# Patient Record
Sex: FEMALE | Race: WHITE | NOT HISPANIC OR LATINO | Employment: UNEMPLOYED | URBAN - METROPOLITAN AREA
[De-identification: names, ages, dates, MRNs, and addresses within clinical notes are randomized per-mention and may not be internally consistent; named-entity substitution may affect disease eponyms.]

---

## 2021-10-23 ENCOUNTER — HOSPITAL ENCOUNTER (EMERGENCY)
Facility: CLINIC | Age: 21
Discharge: HOME | End: 2021-10-23
Attending: EMERGENCY MEDICINE | Admitting: EMERGENCY MEDICINE
Payer: COMMERCIAL

## 2021-10-23 VITALS
HEART RATE: 95 BPM | OXYGEN SATURATION: 100 % | RESPIRATION RATE: 20 BRPM | SYSTOLIC BLOOD PRESSURE: 136 MMHG | DIASTOLIC BLOOD PRESSURE: 96 MMHG

## 2021-10-23 DIAGNOSIS — Z00.00 EVALUATION BY MEDICAL SERVICE REQUIRED: ICD-10-CM

## 2021-10-23 PROCEDURE — 99283 EMERGENCY DEPT VISIT LOW MDM: CPT

## 2021-10-24 ENCOUNTER — HOSPITAL ENCOUNTER (EMERGENCY)
Facility: CLINIC | Age: 21
Discharge: HOME | End: 2021-10-24
Attending: EMERGENCY MEDICINE | Admitting: EMERGENCY MEDICINE
Payer: COMMERCIAL

## 2021-10-24 VITALS
HEART RATE: 82 BPM | OXYGEN SATURATION: 100 % | DIASTOLIC BLOOD PRESSURE: 64 MMHG | SYSTOLIC BLOOD PRESSURE: 100 MMHG | RESPIRATION RATE: 16 BRPM | TEMPERATURE: 98.3 F

## 2021-10-24 DIAGNOSIS — Z59.00 HOMELESS: ICD-10-CM

## 2021-10-24 DIAGNOSIS — F15.90 AMPHETAMINE USER: ICD-10-CM

## 2021-10-24 DIAGNOSIS — F41.9 ANXIETY: ICD-10-CM

## 2021-10-24 DIAGNOSIS — F12.90 MARIJUANA USE: ICD-10-CM

## 2021-10-24 LAB
ALCOHOL BREATH TEST: 0 (ref 0–0.01)
AMPHETAMINES UR QL SCN: ABNORMAL
BARBITURATES UR QL: ABNORMAL
BENZODIAZ UR QL: ABNORMAL
CANNABINOIDS UR QL SCN: ABNORMAL
COCAINE UR QL: ABNORMAL
HCG UR QL: NEGATIVE
OPIATES UR QL SCN: ABNORMAL

## 2021-10-24 PROCEDURE — 90791 PSYCH DIAGNOSTIC EVALUATION: CPT

## 2021-10-24 PROCEDURE — 81025 URINE PREGNANCY TEST: CPT | Performed by: EMERGENCY MEDICINE

## 2021-10-24 PROCEDURE — 80307 DRUG TEST PRSMV CHEM ANLYZR: CPT | Performed by: EMERGENCY MEDICINE

## 2021-10-24 PROCEDURE — 99285 EMERGENCY DEPT VISIT HI MDM: CPT | Mod: 25

## 2021-10-24 PROCEDURE — 82075 ASSAY OF BREATH ETHANOL: CPT

## 2021-10-24 SDOH — ECONOMIC STABILITY - HOUSING INSECURITY: HOMELESSNESS UNSPECIFIED: Z59.00

## 2021-10-24 ASSESSMENT — ENCOUNTER SYMPTOMS
COUGH: 0
FEVER: 0
DYSURIA: 0
NERVOUS/ANXIOUS: 1
ABDOMINAL PAIN: 0
DYSPHORIC MOOD: 1
FEVER: 0
CHILLS: 0
SLEEP DISTURBANCE: 1

## 2021-10-24 NOTE — ED PROVIDER NOTES
History   Chief Complaint:  Mental Health Problem       The history is provided by the patient.      Zarina Pink is a 21 year old female who presents for evaluation by EMS.  Per EMS and healthcare officer hold information, the patient was at [employer] today and had erratic and unpredictable behavior.  She apparently did not answer questions so they placed her on a hold and brought her here due to concern for safety and ability to care for herself.  She tells me that she walked to [facility] in [place] because she was upset, distressed, cold, and homeless. She called her aunt who lives in [place], but her aunt was unable to pick her up. The [facility] security called EMS who brought her here.  She has no suicidal ideation, homicidal ideation, chest pain, cough, fever, chills, abdominal pain, and dysuria. She was in contact with a friend who tested positive for COVID-19, but she tested at urgent care recently and tested negative. Currently, she is staying at a homeless shelter in [place].  She tells me that she went to [facility] so that she could call police to get a ride to her homeless shelter.    Review of Systems   Constitutional: Negative for chills and fever.   Respiratory: Negative for cough.    Cardiovascular: Negative for chest pain.   Gastrointestinal: Negative for abdominal pain.   Genitourinary: Negative for dysuria.   Psychiatric/Behavioral: Negative for suicidal ideas.        Mental Health Problem  Negative for homicidal ideation    All other systems reviewed and are negative.      Allergies:  No Known Allergies    Medications:  None     Past Medical History:     Obesity     Social History:  Presents alone.  Arrival via EMS.   PCP: Chichi Nguyen    Physical Exam     Patient Vitals for the past 24 hrs:   BP Pulse Resp SpO2   10/23/21 2341 (!) 136/96 95 20 100 %       Physical Exam  Constitutional: Well appearing.  HEENT: Atraumatic. Moist mucous membranes.  Neck: Soft.  Supple.   Cardiac: Regular rate and  rhythm.  No murmur or rub.  Respiratory: Clear to auscultation bilaterally.  No respiratory distress.    Abdomen: Soft and nontender.  Nondistended.  Musculoskeletal: No edema.  Normal range of motion.  Neurologic: Alert and oriented x3.  Normal tone and bulk.  Normal gait.  Skin: No rashes.  No edema.  Psych: Normal affect.  Normal behavior.  She exhibits no odd or manic behavior.  No pressured or tangential speech.  She does not appear to be responding to internal stimuli.  She has no suicidal ideation    Emergency Department Course     Emergency Department Course:  Reviewed:  I reviewed nursing notes, vitals, past medical history and Care Everywhere    Assessments:  1141 I obtained history and examined the patient as noted above.     Disposition:  The patient was discharged to home.     Impression & Plan   Medical Decision Making:  Zarina Pink is a 21-year-old woman who is afebrile and hemodynamically stable.  She is alert and fully oriented.  She apparently was less uncooperative with law enforcement and EMS prompting their bringing her here.  Here, she states she is stressed and homeless and was cold and tired, however, she went to [employer] to get a ride to a homeless shelter.  She denies any mental health complaints is not concerned about her mental health.  She has no suicidal ideation.  She exhibits no automatic behavior at this time.  She has no physical complaints.  She was initially hesitant to provide much history, however, stated multiple times that she just wanted [employer] to get a ride to a homeless shelter.  I offered to help her get to homeless shelter and stated that we will call and find a shelter for her.  She asked if she can just leave.  I discussed that she is welcome to leave as there is no indication to hold her here against her will, however, we again offered to set her up to find shelter for tonight.  She declined and stated that she wanted to leave the nursing staff and she left.  She  appeared of sound mind and able to make her own safe decisions.    Diagnosis:    ICD-10-CM    1. Evaluation by medical service required  Z00.00        Scribe Disclosure:  I, Gonzales Levin, am serving as a scribe at 11:37 PM on 10/23/2021 to document services personally performed by Quoc Younger MD based on my observations and the provider's statements to me.            Quoc Younger MD  10/24/21 0058       Quoc Younger MD  10/24/21 0327
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
## 2021-10-24 NOTE — ED TRIAGE NOTES
Brought in by EMS for paranoid behaviors.  Pt refusing to answer questions or cooperate with exam.

## 2021-10-24 NOTE — ED PROVIDER NOTES
History   Chief Complaint:  Mental Health Problem       HPI   Zarina Pink is a 21 year old female presenting for mental health evaluation.  Patient was seen a few hours by my partner though patient declined wanting evaluation at that point time.  She admitted to being homeless though was not deemed to be an immediate threat to herself and appeared to have capacity to make her own medical decisions.  She represents with her friend Alexandro who expresses concerns for patient's mental health.  Alexandro tells me that patient is tangential in her thinking and is not sleeping.  Patient denies any thoughts of self-harm or hallucinations.  She denies any physical symptoms.  She states she is homeless and has nowhere to go and is very anxious about what is going to happen.  She does admit to using meth and marijuana yesterday as well as occasional alcohol use though none today.    Review of Systems   Constitutional: Negative for fever.   Psychiatric/Behavioral: Positive for dysphoric mood and sleep disturbance. Negative for self-injury and suicidal ideas. The patient is nervous/anxious.    All other systems reviewed and are negative.        Allergies:  No Known Allergies     Medications:  None      Past Medical History:     Obesity      Social History:  Presents alone.  PCP: Chichi Nguyen    Physical Exam     Patient Vitals for the past 24 hrs:   BP Temp Temp src Pulse Resp SpO2   10/24/21 0056 (!) 146/94 98.3  F (36.8  C) Oral 95 20 99 %       Physical Exam  Nursing note and vitals reviewed.  Constitutional: Well nourished. Resting comfortably.   Eyes: Conjunctiva normal.  Pupils are equal, round, and reactive to light.   ENT: Nose normal. Mucous membranes pink and moist.    Neck: Normal range of motion.  CVS: Normal rate, regular rhythm.  Normal heart sounds.  No murmur.  Pulmonary: Lungs clear to auscultation bilaterally. No wheezes/rales/rhonchi.  GI: Abdomen soft. Nontender, nondistended. No rigidity or guarding.     MSK: No calf tenderness or swelling.  Neuro: Alert. Follows simple commands.  Skin: Skin is warm and dry. No rash noted.   Psychiatric: Flat affect, denies hallucinations or suicidal ideations .  Speaks very little to me with brief responses.      Emergency Department Course     Laboratory:  Labs Ordered and Resulted from Time of ED Arrival Up to the Time of Departure from the ED   DRUG ABUSE SCREEN 1 URINE (ED) - Abnormal; Notable for the following components:       Result Value    Amphetamines Urine Screen Positive (*)     Cannabinoids Urine Screen Positive (*)     All other components within normal limits   HCG QUALITATIVE URINE - Normal   ALCOHOL BREATH TEST POCT - Normal   DOCUMENT IN LEGAL HOLD NAVIGATOR   URINE DRUGS OF ABUSE SCREEN    Narrative:     The following orders were created for panel order Urine Drugs of Abuse Screen.  Procedure                               Abnormality         Status                     ---------                               -----------         ------                     Drug abuse screen 1 urin...[[id number]]  Abnormal            Final result                 Please view results for these tests on the individual orders.        Emergency Department Course:  Reviewed:  I reviewed nursing notes, vitals and past medical history    Assessments:   I obtained history and examined the patient as noted above.    I rechecked the patient and explained findings    Consults  6:21 AM   Patient was evaluated by DEC    Disposition:  Patient to be discharged home pending pickup from patient's aunt    Impression & Plan     Medical Decision Making:  Patient is a 21-year-old female presenting for mental health evaluation.  She was already evaluated in the ED a few hours prior to my evaluation.  Her friend accompanies her now stating that patient is tangential and not sleeping.  Her friend is concerned about her safety.  Patient does admit to being homeless.  She is requesting to speak to DEC today.   Patient denies any suicidal ideations or hallucinations to me though was a very limited historian.  She does not appear psychotic, however and is currently not on an RANJEET.  She did test positive for marijuana and amphetamine use which she admits to. Patient did speak to DEC during her time in the ED. She does not appear to need inpatient psychiatric placement at this point in time. Patient is not psychotic and quite lucid in her thinking. She denies again suicidal ideations. I did offer her a list of homeless shelters but she is declining. She states she has a place to stay in [place]. Patient to be discharged with her aunt and is pending pickup at this point in time.  She is signed out to my partner Dr. Duffy.     Diagnosis:    ICD-10-CM    1. Homeless  Z59.00    2. Depression, unspecified depression type  F32.A    3. Amphetamine user (H)  F15.10    4. Marijuana use  F12.90        Discharge Medications:  New Prescriptions    No medications on file          Kellie Miranda DO  10/24/21 0667

## 2021-10-24 NOTE — ED TRIAGE NOTES
Pt was seen at this facility earlier today via EMS for mental health evaluation. Pt requested to leave shortly after arrival. Pt states she wishes to be re-evaluated. Pt expresses concern for possible pregnancy as well. ABCs intact GCS 15

## 2023-03-31 ENCOUNTER — HOSPITAL ENCOUNTER (EMERGENCY)
Facility: CLINIC | Age: 23
Discharge: HOME | End: 2023-03-31
Attending: NURSE PRACTITIONER | Admitting: NURSE PRACTITIONER
Payer: COMMERCIAL

## 2023-03-31 VITALS
OXYGEN SATURATION: 100 % | DIASTOLIC BLOOD PRESSURE: 73 MMHG | HEART RATE: 115 BPM | SYSTOLIC BLOOD PRESSURE: 120 MMHG | RESPIRATION RATE: 18 BRPM | TEMPERATURE: 98.1 F

## 2023-03-31 DIAGNOSIS — Z20.2 STD EXPOSURE: ICD-10-CM

## 2023-03-31 DIAGNOSIS — N89.8 VAGINAL DISCHARGE: ICD-10-CM

## 2023-03-31 LAB
ALBUMIN UR-MCNC: 100 MG/DL
APPEARANCE UR: ABNORMAL
BACTERIA #/AREA URNS HPF: ABNORMAL /HPF
BILIRUB UR QL STRIP: NEGATIVE
CAOX CRY #/AREA URNS HPF: ABNORMAL /HPF
CLUE CELLS: NORMAL
COLOR UR AUTO: YELLOW
GLUCOSE UR STRIP-MCNC: NEGATIVE MG/DL
HGB UR QL STRIP: NEGATIVE
KETONES UR STRIP-MCNC: 5 MG/DL
LEUKOCYTE ESTERASE UR QL STRIP: ABNORMAL
MUCOUS THREADS #/AREA URNS LPF: PRESENT /LPF
NITRATE UR QL: NEGATIVE
PH UR STRIP: 5 [PH] (ref 5–7)
RBC URINE: 21 /HPF
SP GR UR STRIP: 1.03 (ref 1–1.03)
SQUAMOUS EPITHELIAL: >182 /HPF
TRICHOMONAS, WET PREP: NORMAL
UROBILINOGEN UR STRIP-MCNC: NORMAL MG/DL
WBC URINE: >182 /HPF
WBC'S/HIGH POWER FIELD, WET PREP: NORMAL
YEAST, WET PREP: NORMAL

## 2023-03-31 PROCEDURE — 96372 THER/PROPH/DIAG INJ SC/IM: CPT | Performed by: NURSE PRACTITIONER

## 2023-03-31 PROCEDURE — 87591 N.GONORRHOEAE DNA AMP PROB: CPT | Performed by: NURSE PRACTITIONER

## 2023-03-31 PROCEDURE — 99213 OFFICE O/P EST LOW 20 MIN: CPT | Performed by: NURSE PRACTITIONER

## 2023-03-31 PROCEDURE — 250N000009 HC RX 250: Performed by: NURSE PRACTITIONER

## 2023-03-31 PROCEDURE — G0463 HOSPITAL OUTPT CLINIC VISIT: HCPCS | Performed by: NURSE PRACTITIONER

## 2023-03-31 PROCEDURE — G0463 HOSPITAL OUTPT CLINIC VISIT: HCPCS | Mod: 25 | Performed by: NURSE PRACTITIONER

## 2023-03-31 PROCEDURE — 81001 URINALYSIS AUTO W/SCOPE: CPT | Performed by: NURSE PRACTITIONER

## 2023-03-31 PROCEDURE — 87086 URINE CULTURE/COLONY COUNT: CPT | Performed by: NURSE PRACTITIONER

## 2023-03-31 PROCEDURE — 87210 SMEAR WET MOUNT SALINE/INK: CPT | Performed by: NURSE PRACTITIONER

## 2023-03-31 PROCEDURE — 250N000011 HC RX IP 250 OP 636: Performed by: NURSE PRACTITIONER

## 2023-03-31 RX ORDER — DOXYCYCLINE 100 MG/1
100 CAPSULE ORAL 2 TIMES DAILY
Qty: 14 CAPSULE | Refills: 0 | Status: SHIPPED | OUTPATIENT
Start: 2023-03-31 | End: 2023-04-07

## 2023-03-31 RX ADMIN — LIDOCAINE HYDROCHLORIDE 500 MG: 10 INJECTION, SOLUTION EPIDURAL; INFILTRATION; INTRACAUDAL; PERINEURAL at 18:19

## 2023-03-31 ASSESSMENT — ENCOUNTER SYMPTOMS
JOINT SWELLING: 0
NAUSEA: 0
EYE DISCHARGE: 0
SINUS PRESSURE: 0
ABDOMINAL PAIN: 0
CHILLS: 0
FATIGUE: 0
SHORTNESS OF BREATH: 0
HEADACHES: 0
COUGH: 0
LIGHT-HEADEDNESS: 0
VOMITING: 0
RHINORRHEA: 0
SORE THROAT: 0
TROUBLE SWALLOWING: 0
EYE REDNESS: 0
WEAKNESS: 0
NUMBNESS: 0
FEVER: 0
SINUS PAIN: 0
ARTHRALGIAS: 0
MYALGIAS: 0
DIZZINESS: 0

## 2023-03-31 ASSESSMENT — ACTIVITIES OF DAILY LIVING (ADL): ADLS_ACUITY_SCORE: 35

## 2023-03-31 NOTE — ED TRIAGE NOTES
Pt states she believes partner tested positive for gonorrhea and would like std testing today

## 2023-03-31 NOTE — ED PROVIDER NOTES
History     Chief Complaint   Patient presents with     Exposure to STD     HPI  Zarina Pink is a 23 year old female who presents to the urgent care for evaluation due to STD exposure. Reports her boyfriend was tested for gonorrhea and chlamydia today, treated, and awaiting results. She is requesting the same. She noticed increased thick white vaginal discharge today with slight malodor. No fevers, abdominal pain, flank pain, pelvic pain, hematuria, vaginal bleeding. Denies chance of pregnancy.       Allergies:  No Known Allergies    Problem List:    Patient Active Problem List    Diagnosis Date Noted     Obesity 10/20/2011     Priority: Medium        Past Medical History:    No past medical history on file.    Past Surgical History:    No past surgical history on file.    Family History:    Family History   Problem Relation Age of Onset     Diabetes Maternal Grandfather      Diabetes Other      Asthma Maternal Grandmother      Cancer No family hx of      Hypertension No family hx of      Cerebrovascular Disease No family hx of      Thyroid Disease No family hx of      Glaucoma No family hx of      Macular Degeneration No family hx of        Social History:  Marital Status:  Single [1]  Social History     Tobacco Use     Smoking status: Never     Smokeless tobacco: Never     Tobacco comments:     Lives in smoke free household   Substance Use Topics     Alcohol use: No     Drug use: No        Medications:    doxycycline hyclate (VIBRAMYCIN) 100 MG capsule  NO ACTIVE MEDICATIONS          Review of Systems   Constitutional: Negative for chills, fatigue and fever.   HENT: Negative for congestion, ear discharge, ear pain, rhinorrhea, sinus pressure, sinus pain, sore throat and trouble swallowing.    Eyes: Negative for discharge and redness.   Respiratory: Negative for cough and shortness of breath.    Cardiovascular: Negative for chest pain.   Gastrointestinal: Negative for abdominal pain, nausea and vomiting.    Genitourinary: Positive for vaginal discharge.   Musculoskeletal: Negative for arthralgias, joint swelling and myalgias.   Skin: Negative for rash.   Neurological: Negative for dizziness, weakness, light-headedness, numbness and headaches.   All other systems reviewed and are negative.      Physical Exam   BP: 120/73  Pulse: 115  Temp: 98.1  F (36.7  C)  Resp: 18  SpO2: 100 %      Physical Exam  Constitutional:       General: She is not in acute distress.     Appearance: She is well-developed. She is not diaphoretic.   Eyes:      Conjunctiva/sclera: Conjunctivae normal.      Pupils: Pupils are equal, round, and reactive to light.   Cardiovascular:      Rate and Rhythm: Normal rate and regular rhythm.      Pulses: Normal pulses.   Pulmonary:      Effort: Pulmonary effort is normal.      Breath sounds: Normal air entry. No decreased air movement. No decreased breath sounds.   Musculoskeletal:         General: Normal range of motion.      Cervical back: Normal range of motion and neck supple.   Skin:     General: Skin is warm.      Capillary Refill: Capillary refill takes less than 2 seconds.   Neurological:      General: No focal deficit present.      Mental Status: She is alert and oriented to person, place, and time.   Psychiatric:         Mood and Affect: Mood normal.         ED Course                 Procedures           Results for orders placed or performed during the hospital encounter of 03/31/23 (from the past 24 hour(s))   UA with Microscopic reflex to Culture    Specimen: Urine, Clean Catch   Result Value Ref Range    Color Urine Yellow Colorless, Straw, Light Yellow, Yellow    Appearance Urine Cloudy (A) Clear    Glucose Urine Negative Negative mg/dL    Bilirubin Urine Negative Negative    Ketones Urine 5 (A) Negative mg/dL    Specific Gravity Urine 1.028 1.003 - 1.035    Blood Urine Negative Negative    pH Urine 5.0 5.0 - 7.0    Protein Albumin Urine 100 (A) Negative mg/dL    Urobilinogen Urine Normal  Normal, 2.0 mg/dL    Nitrite Urine Negative Negative    Leukocyte Esterase Urine Moderate (A) Negative    Bacteria Urine Many (A) None Seen /HPF    Mucus Urine Present (A) None Seen /LPF    Calcium Oxalate Crystals Urine Many (A) None Seen /HPF    RBC Urine 21 (H) <=2 /HPF    WBC Urine >182 (H) <=5 /HPF    Squamous Epithelials Urine >182 (H) <=1 /HPF    Narrative    Urine Culture ordered based on laboratory criteria   Wet prep    Specimen: Vagina; Swab   Result Value Ref Range    Trichomonas Absent Absent    Yeast Absent Absent    Clue Cells Absent Absent    WBCs/high power field None None       Medications   cefTRIAXone (ROCEPHIN) 500 mg in lidocaine injection (500 mg Intramuscular $Given 3/31/23 1819)       Assessments & Plan (with Medical Decision Making)   Zarina Pink is a 23 year old female who presents to the urgent care for evaluation due to STD exposure. Reports her boyfriend was tested for gonorrhea and chlamydia today, treated, and awaiting results. She is requesting the same. She noticed increased thick white vaginal discharge today with slight malodor. Vital signs normal, physical exam as above. Wet prep negative. Urinalysis grossly contaminated. Gonorrhea and chlamydia pending. IM Rocephin given prior to discharge, rx doxycycline. Return precautions reviewed, all questions answered. Patient is agreeable to plan of care and discharged in no acute distress.     I have reviewed the nursing notes.    I have reviewed the findings, diagnosis, plan and need for follow up with the patient.    Discharge Medication List as of 3/31/2023  6:17 PM      START taking these medications    Details   doxycycline hyclate (VIBRAMYCIN) 100 MG capsule Take 1 capsule (100 mg) by mouth 2 times daily for 7 days, Disp-14 capsule, R-0, E-Prescribe             Final diagnoses:   STD exposure   Vaginal discharge       3/31/2023   [facility] EMERGENCY DEPT     Anastasiia Simpson, APRN CNP  03/31/23 8577

## 2023-03-31 NOTE — ED TRIAGE NOTES
Pt was advised to stay 20min after injection and stated that she wanted to wait in the lobby area.

## 2023-04-01 ENCOUNTER — TELEPHONE (OUTPATIENT)
Dept: EMERGENCY MEDICINE | Facility: CLINIC | Age: 23
End: 2023-04-01
Payer: COMMERCIAL

## 2023-04-01 LAB
C TRACH DNA SPEC QL PROBE+SIG AMP: NEGATIVE
N GONORRHOEA DNA SPEC QL NAA+PROBE: POSITIVE

## 2023-04-01 NOTE — TELEPHONE ENCOUNTER
[facility] Emergency Department/Urgent Care Lab result notification  [Note:  ED Lab Results RN will reference the [facility] Emergency Dept visit note prior to contacting patient AND/OR prior to consulting Emergency Dept Provider.  Highlights of Emergency Dept visit in information summary at the bottom of this telephone note]    1. Reason for call    Notify the patient/parent of lab results    Assess patient symptoms    Review ED providers recommendations/discharge instructions (if necessary)    Advise per [facility] ED lab result protocol    2. Lab Result (including Rx patient on, if applicable).  Copy of lab result from [facility] at bottom of charting  Final N. Gonorrhoeae PCR is [POSITIVE] AND Chlamydia T PCR is [NEGATIVE]  (Specimen source: Urine, Voided)  Patient was treated for N. Gonorrhea AND/OR Chlamydia T in the [facility] Emergency Dept  [Yes or No]:  YES       If Yes, list what was given in the ED:  cefTRIAXone (ROCEPHIN) 500 mg in lidocaine injection -  500 mg, Intramuscular, ONCE  [facility] Emergency Dept discharge antibiotic (if prescribed): Doxycycline 100 mg PO tablet, 1 tablet (100 mg) by mouth 2 times daily for 7 days  Recommendations in treatment per [facility] ED lab result Chlamydia T. AND/OR N. Gonorrhea protocol    3. RN Assessment (Patient's current Symptoms):    Time of call: 4/1/2023 11:57 AM    Assessment: recently seen has been drinking in a lot of water    Fever:    No    Genitourinary symptoms:  No pain, burning with urination, urgency, frequency    Vaginal discharge:  Continues with vaginal discharge    Oral Sex:  YES    4. RN Recommendations/Instructions per [facility] ED lab result protocol    [facility] ED lab result protocol used: Gonorrheae    RN will consult with [facility] Emergency Dept Provider and then call patient back with recommendations (Yes/No/NA): NO    Patient notified of lab result and treatment recommendations  (Yes/No): YES    RN reviewed information about positive results and patient given appropriate treatment coverage, STD instructions, AVS instructions, discussed f/u appointment with PCP is encouraged and should discuss 'test for cure' throat swab for gonorrhea with PCP since she notes participation.  Urine culture in process - team only calls on positive results - not on negative - welcome to call for results as well.  Return at anytime for worsening - fevers, flank pain, nausea/vomiting, severe continuous abdominal pain, severe weakness - patient verbalized understanding and agrees with plan.      STD Patient Instructions:    We recommend that you contact any recent sexual partners within the last 2 months and have them evaluated by a physician.    Avoid sexual activity for 7 to 10 days or until both you and your partner(s) have completed all antibiotic medications.    Contact PCP or return to the Emergency Dept within 24 hours if you are experiencing persistent or recurrent symptoms soon after completing therapy.    We advise that you consider following up with your PCP at approximately 3 months for retesting to be sure the infection has cleared.      5. Please Contact your PCP clinic or return to the Emergency department if your:    Symptoms return.    Symptoms do not improve after 3 days on antibiotic.    Symptoms do not resolve after completing antibiotic.    Symptoms worsen or other concerning symptoms.    7. Copy of Lab result   Component      Latest Ref Rng & Units 3/31/2023   Chlamydia Trachomatis PCR      Negative Negative   Neisseria gonorrhoeae      Negative Positive (A)         Bianka Chawla RN  [facility] [place]  Emergency Dept Lab Result RN  Ph# [phone]

## 2023-04-02 ENCOUNTER — APPOINTMENT (OUTPATIENT)
Dept: URGENT CARE | Facility: CLINIC | Age: 23
End: 2023-04-02
Payer: COMMERCIAL

## 2023-04-02 LAB — BACTERIA UR CULT: NORMAL

## 2023-04-16 ENCOUNTER — HEALTH MAINTENANCE LETTER (OUTPATIENT)
Age: 23
End: 2023-04-16

## 2023-07-04 ENCOUNTER — HOSPITAL ENCOUNTER (EMERGENCY)
Facility: CLINIC | Age: 23
Discharge: HOME | End: 2023-07-04
Attending: FAMILY MEDICINE | Admitting: FAMILY MEDICINE
Payer: COMMERCIAL

## 2023-07-04 VITALS
TEMPERATURE: 97.6 F | BODY MASS INDEX: 24.89 KG/M2 | RESPIRATION RATE: 18 BRPM | HEIGHT: 67 IN | SYSTOLIC BLOOD PRESSURE: 116 MMHG | WEIGHT: 158.6 LBS | HEART RATE: 94 BPM | DIASTOLIC BLOOD PRESSURE: 75 MMHG | OXYGEN SATURATION: 98 %

## 2023-07-04 DIAGNOSIS — A60.04 HERPES SIMPLEX VULVOVAGINITIS: ICD-10-CM

## 2023-07-04 PROCEDURE — 87591 N.GONORRHOEAE DNA AMP PROB: CPT | Performed by: FAMILY MEDICINE

## 2023-07-04 PROCEDURE — 250N000013 HC RX MED GY IP 250 OP 250 PS 637: Performed by: FAMILY MEDICINE

## 2023-07-04 PROCEDURE — 87491 CHLMYD TRACH DNA AMP PROBE: CPT | Performed by: FAMILY MEDICINE

## 2023-07-04 PROCEDURE — 87529 HSV DNA AMP PROBE: CPT | Performed by: FAMILY MEDICINE

## 2023-07-04 PROCEDURE — 99284 EMERGENCY DEPT VISIT MOD MDM: CPT | Performed by: FAMILY MEDICINE

## 2023-07-04 PROCEDURE — 99283 EMERGENCY DEPT VISIT LOW MDM: CPT | Performed by: FAMILY MEDICINE

## 2023-07-04 RX ORDER — VALACYCLOVIR HYDROCHLORIDE 500 MG/1
1000 TABLET, FILM COATED ORAL ONCE
Status: COMPLETED | OUTPATIENT
Start: 2023-07-04 | End: 2023-07-04

## 2023-07-04 RX ORDER — VALACYCLOVIR HYDROCHLORIDE 1 G/1
1000 TABLET, FILM COATED ORAL 2 TIMES DAILY
Qty: 14 TABLET | Refills: 0 | Status: SHIPPED | OUTPATIENT
Start: 2023-07-04 | End: 2023-07-11

## 2023-07-04 RX ADMIN — VALACYCLOVIR HYDROCHLORIDE 1000 MG: 500 TABLET, FILM COATED ORAL at 23:33

## 2023-07-04 ASSESSMENT — ACTIVITIES OF DAILY LIVING (ADL): ADLS_ACUITY_SCORE: 33

## 2023-07-05 ENCOUNTER — TELEPHONE (OUTPATIENT)
Dept: EMERGENCY MEDICINE | Facility: CLINIC | Age: 23
End: 2023-07-05
Payer: COMMERCIAL

## 2023-07-05 ENCOUNTER — HOSPITAL ENCOUNTER (EMERGENCY)
Facility: CLINIC | Age: 23
Discharge: HOME | End: 2023-07-05
Attending: EMERGENCY MEDICINE | Admitting: EMERGENCY MEDICINE
Payer: COMMERCIAL

## 2023-07-05 VITALS
TEMPERATURE: 98.3 F | DIASTOLIC BLOOD PRESSURE: 84 MMHG | OXYGEN SATURATION: 99 % | HEIGHT: 67 IN | BODY MASS INDEX: 24.33 KG/M2 | WEIGHT: 155 LBS | HEART RATE: 101 BPM | SYSTOLIC BLOOD PRESSURE: 121 MMHG

## 2023-07-05 DIAGNOSIS — A54.9 GONORRHEA: ICD-10-CM

## 2023-07-05 LAB
C TRACH DNA SPEC QL NAA+PROBE: NEGATIVE
HSV1 DNA SPEC QL NAA+PROBE: NOT DETECTED
HSV2 DNA SPEC QL NAA+PROBE: DETECTED
N GONORRHOEA DNA SPEC QL NAA+PROBE: POSITIVE

## 2023-07-05 PROCEDURE — 99284 EMERGENCY DEPT VISIT MOD MDM: CPT | Performed by: EMERGENCY MEDICINE

## 2023-07-05 PROCEDURE — 96372 THER/PROPH/DIAG INJ SC/IM: CPT | Performed by: EMERGENCY MEDICINE

## 2023-07-05 PROCEDURE — 250N000011 HC RX IP 250 OP 636: Mod: JZ | Performed by: EMERGENCY MEDICINE

## 2023-07-05 PROCEDURE — 99283 EMERGENCY DEPT VISIT LOW MDM: CPT | Performed by: EMERGENCY MEDICINE

## 2023-07-05 PROCEDURE — 250N000009 HC RX 250: Performed by: EMERGENCY MEDICINE

## 2023-07-05 RX ORDER — CEFTRIAXONE SODIUM 1 G
500 VIAL (EA) INJECTION ONCE
Status: DISCONTINUED | OUTPATIENT
Start: 2023-07-05 | End: 2023-07-05

## 2023-07-05 RX ADMIN — LIDOCAINE HYDROCHLORIDE 500 MG: 10 INJECTION, SOLUTION EPIDURAL; INFILTRATION; INTRACAUDAL; PERINEURAL at 14:36

## 2023-07-05 ASSESSMENT — ACTIVITIES OF DAILY LIVING (ADL): ADLS_ACUITY_SCORE: 33

## 2023-07-05 NOTE — ED TRIAGE NOTES
Pt presents with concerns for genital herpes. Reports bumps and clear/yellow/purulent drainage. Sx began today. Hx of chlamydia and gonorrhea.      Triage Assessment     Row Name 07/04/23 7195       Triage Assessment (Adult)    Airway WDL WDL       Respiratory WDL    Respiratory WDL WDL       Skin Circulation/Temperature WDL    Skin Circulation/Temperature WDL WDL       Cardiac WDL    Cardiac WDL WDL       Peripheral/Neurovascular WDL    Peripheral Neurovascular WDL WDL       Cognitive/Neuro/Behavioral WDL    Cognitive/Neuro/Behavioral WDL WDL

## 2023-07-05 NOTE — ED PROVIDER NOTES
"  History     Chief Complaint   Patient presents with     Abnormal Labs     Pos gonorrhea     HPI  Zarina Pink is a 23 year old female who seen yesterday here diagnosed with herpes genitalis, cervical swabs showed gonorrhea and presents here for treatment of same.  She has notified her partner per her report.  She has not started Valtrex as of yet.  She denies fevers chills sweats sore throat or joint symptoms.    Allergies:  No Known Allergies    Problem List:    Patient Active Problem List    Diagnosis Date Noted     Obesity 10/20/2011     Priority: Medium        Past Medical History:    No past medical history on file.    Past Surgical History:    No past surgical history on file.    Family History:    Family History   Problem Relation Age of Onset     Diabetes Maternal Grandfather      Diabetes Other      Asthma Maternal Grandmother      Cancer No family hx of      Hypertension No family hx of      Cerebrovascular Disease No family hx of      Thyroid Disease No family hx of      Glaucoma No family hx of      Macular Degeneration No family hx of        Social History:  Marital Status:  Single [1]  Social History     Tobacco Use     Smoking status: Never     Smokeless tobacco: Never     Tobacco comments:     Lives in smoke free household   Substance Use Topics     Alcohol use: No     Drug use: No        Medications:    NO ACTIVE MEDICATIONS  valACYclovir (VALTREX) 1000 mg tablet          Review of Systems    Physical Exam   BP: 121/84  Pulse: 101  Temp: 98.3  F (36.8  C)  Height: 170.2 cm (5' 7\")  Weight: 70.3 kg (155 lb)  SpO2: 99 %      Physical Exam  Alert no respiratory distress skin Pink warm and dry  ED Course                 Procedures                Results for orders placed or performed during the hospital encounter of 07/04/23 (from the past 24 hour(s))   Chlamydia trachomatis PCR    Specimen: Urine, Voided   Result Value Ref Range    Chlamydia trachomatis Negative Negative   Neisseria gonorrhoea PCR "    Specimen: Urine, Voided   Result Value Ref Range    Neisseria gonorrhoeae Positive (A) Negative       Medications   cefTRIAXone (ROCEPHIN) 500 mg in lidocaine injection (has no administration in time range)       Assessments & Plan (with Medical Decision Making)  23-year-old female seen here yesterday, gonorrhea swab came back positive.  Ceftriaxone 500 mg IM.  Valtrex as prescribed yesterday.  Alert partner, use condoms, return criteria reviewed     I have reviewed the nursing notes.    I have reviewed the findings, diagnosis, plan and need for follow up with the patient.          New Prescriptions    No medications on file       Final diagnoses:   Gonorrhea       7/5/2023   [facility] EMERGENCY DEPT     Rich oCol MD  07/05/23 1113

## 2023-07-05 NOTE — DISCHARGE INSTRUCTIONS
Use condoms    Recheck for fever, pelvic pain, nausea vomiting, joint swelling or other concern    Take Valtrex as prescribed

## 2023-07-05 NOTE — ED TRIAGE NOTES
Pt seen in ER for vaginal flare up and was tested for gonorrhea. Pt was told to come back to be treated in symptoms were positive.       Triage Assessment     Row Name 07/05/23 1138       Triage Assessment (Adult)    Airway WDL WDL       Respiratory WDL    Respiratory WDL WDL       Skin Circulation/Temperature WDL    Skin Circulation/Temperature WDL WDL       Cardiac WDL    Cardiac WDL WDL       Peripheral/Neurovascular WDL    Peripheral Neurovascular WDL WDL       Cognitive/Neuro/Behavioral WDL    Cognitive/Neuro/Behavioral WDL WDL

## 2023-07-05 NOTE — DISCHARGE INSTRUCTIONS
Take valacyclovir 1000 mg twice daily for 7 days.  We will call you if the test results are positive.  However even if they are negative this is likely to be a herpes virus infection.  This means you may have recurrent outbreaks after this initial outbreak.  The first outbreak can become more severe and you may develop more lesions and more discomfort.  Sometimes it can cause difficulty emptying your bladder.  If you are not able to urinate or if you have severe symptoms, you should return to the emergency department.

## 2023-07-05 NOTE — TELEPHONE ENCOUNTER
[facility] Emergency Department/Urgent Care Lab result notification  [Note:  ED Lab Results RN will reference the [facility] Emergency Dept visit note prior to contacting patient AND/OR prior to consulting Emergency Dept Provider.  Highlights of Emergency Dept visit in information summary at the bottom of this telephone note]    1. Reason for call    Notify of lab results    Assess patient symptoms [if necessary]    Review ED Providers recommendations/discharge instructions (if necessary)    Advise per [facility] ED lab result protocol    2. Lab Result (including Rx patient on, if applicable).  If culture, copy of lab report at bottom.  Final N. Gonorrhoeae PCR is POSITIVE. (Specimen source: urine voided)  Patient was treated appropriately in the ED [Yes or No]:  No       [facility] Emergency Dept discharge antibiotic prescribed [if applicable]: Valacyclovir 1000 mg bid x 7 days  Recommendations in treatment per [facility] ED Lab Result N. Gonorrhea protocol     3. RN Assessment (Patient's current Symptoms):    Time of call:  10:49a    Assessment:  Relayed result to patient and encouraged injection of Rocephin in the next 24 hours and patient stated understanding, that she will return to ED for treatment.    4. RN Recommendations/Instructions per [facility] ED lab result protocol    [facility] ED lab result protocol used: N gonorrhea    Zarina was notified of lab result and treatment recommendations    Advised to discontinue current antibiotic NA  RN reviewed information about STD Patient Instructions:  We recommend that you contact any recent sexual partners within the last 2 months and have them evaluated by a physician.  Avoid sexual activity for 7 to 10 days or until both you and your partner(s) have completed all antibiotic medications.  Contact PCP or return to the Emergency Dept within 24 hours if you are experiencing persistent or recurrent symptoms soon after  completing therapy.  We advise that you consider following up with your PCP at approximately 3 months for retesting to be sure the infection has cleared.      5. Please Contact your PCP clinic or return to the Emergency department if your:    Symptoms return.    Symptoms do not improve after 3 days on antibiotic.    Symptoms do not resolve after completing antibiotic.    Symptoms worsen or other concerning symptoms.    Information summary from Emergency Dept/Urgent Care visit on 7/4/23  Symptoms reported at ED visit (Chief complaint, HPI)   STD concern      HPI  Zarina Pink is a 23 year old female who comes in with concerns about vaginal blisters that she developed yesterday.  She has more of them today.  She is concerned that she has herpes.  She has had a recent new sexual partner.  She has no past history of herpes simplex.        Significant Medical hx, if applicable (i.e. CKD, diabetes) Reviewed   Allergies No Known Allergies   Weight, if applicable Wt Readings from Last 2 Encounters:   07/04/23 71.9 kg (158 lb 9.6 oz)   10/19/11 59.4 kg (131 lb) (84 %, Z= 1.00)*     * Growth percentiles are based on CDC (Girls, 2-20 Years) data.      Coumadin/Warfarin [Yes /No] NO   Creatinine Level (mg/dl) No results found for: CR   Creatinine clearance (ml/min), if applicable Creatinine clearance cannot be calculated (No successful lab value found.)   Pregnant (Yes/No/NA) NA   Breastfeeding (Yes/No/NA) NA   ED providers Impression and Plan (applicable information)    23-year-old female presents with blisters on the vulva after a new sexual contact.  Physical examination shows clustered small vesicles.  Physical exam is consistent with HSV.  Sample was taken to attempt to confirm the diagnosis but will initiate treatment with Valtrex 1000 mg twice daily for 7 days.  We discussed that the clinical course can be mild or severe and if she develops severe symptoms of urinary retention she needs to return to the emergency  department.  We discussed actual transmission and recurrences which can be frequent or infrequent.  We will notify her if the lab tests are positive but even if negative this is still likely to be HSV and she should finish the course of antiviral medication.   ED diagnosis Herpes simplex vulvovaginitis         ED provider Obi Vasques MD        Copy of Lab report (if applicable)      Darlene Skinner RN  [facility]  Emergency Dept Lab Result RN  Ph# [phone]

## 2023-07-05 NOTE — ED PROVIDER NOTES
"  History     Chief Complaint   Patient presents with     STD concern     HPI  Zarina Pink is a 23 year old female who comes in with concerns about vaginal blisters that she developed yesterday.  She has more of them today.  She is concerned that she has herpes.  She has had a recent new sexual partner.  She has no past history of herpes simplex.    Allergies:  No Known Allergies    Problem List:    Patient Active Problem List    Diagnosis Date Noted     Obesity 10/20/2011     Priority: Medium        Past Medical History:    No past medical history on file.    Past Surgical History:    No past surgical history on file.    Family History:    Family History   Problem Relation Age of Onset     Diabetes Maternal Grandfather      Diabetes Other      Asthma Maternal Grandmother      Cancer No family hx of      Hypertension No family hx of      Cerebrovascular Disease No family hx of      Thyroid Disease No family hx of      Glaucoma No family hx of      Macular Degeneration No family hx of        Social History:  Marital Status:  Single [1]  Social History     Tobacco Use     Smoking status: Never     Smokeless tobacco: Never     Tobacco comments:     Lives in smoke free household   Substance Use Topics     Alcohol use: No     Drug use: No        Medications:    valACYclovir (VALTREX) 1000 mg tablet  NO ACTIVE MEDICATIONS      Review of Systems     All other systems are reviewed and are negative    Physical Exam   BP: 120/73  Pulse: 99  Temp: 97.6  F (36.4  C)  Resp: 16  Height: 170.2 cm (5' 7\")  Weight: 71.9 kg (158 lb 9.6 oz)  SpO2: 98 %      Physical Exam     Nursing note and vitals were reviewed.  Constitutional: Awake and alert, adequately nourished and developed appearing 23-year-old in no apparent discomfort, who does not appear acutely ill, and who answers questions appropriately and cooperates with examination.  HEENT: Voice quality is normal EOMI.     Pulmonary/Chest: Breathing is unlabored.    Neurological: " Alert, oriented, thought content logical, coherent   Psychiatric: Affect broad and appropriate.  Genitalia: There is a cluster of vesicular lesions on the left labia majora on erythematous bases.  Several of these were unroofed and samples taken with a swab and viral transport medium.  The hair of the vulva has been shaved but no other lesions are seen.    ED Course                 Procedures              Critical Care time:  none               No results found for this or any previous visit (from the past 24 hour(s)).    Medications   valACYclovir (VALTREX) tablet 1,000 mg (has no administration in time range)       Assessments & Plan (with Medical Decision Making)     23-year-old female presents with blisters on the vulva after a new sexual contact.  Physical examination shows clustered small vesicles.  Physical exam is consistent with HSV.  Sample was taken to attempt to confirm the diagnosis but will initiate treatment with Valtrex 1000 mg twice daily for 7 days.  We discussed that the clinical course can be mild or severe and if she develops severe symptoms of urinary retention she needs to return to the emergency department.  We discussed actual transmission and recurrences which can be frequent or infrequent.  We will notify her if the lab tests are positive but even if negative this is still likely to be HSV and she should finish the course of antiviral medication.    I have reviewed the nursing notes.    I have reviewed the findings, diagnosis, plan and need for follow up with the patient.         New Prescriptions    VALACYCLOVIR (VALTREX) 1000 MG TABLET    Take 1 tablet (1,000 mg) by mouth 2 times daily for 7 days       Final diagnoses:   Herpes simplex vulvovaginitis       7/4/2023   [facility] EMERGENCY DEPT     Obi Vasques MD  07/04/23 5022

## 2024-06-23 ENCOUNTER — HEALTH MAINTENANCE LETTER (OUTPATIENT)
Age: 24
End: 2024-06-23

## 2025-07-12 ENCOUNTER — HEALTH MAINTENANCE LETTER (OUTPATIENT)
Age: 25
End: 2025-07-12